# Patient Record
Sex: MALE | Race: BLACK OR AFRICAN AMERICAN | NOT HISPANIC OR LATINO | Employment: FULL TIME | URBAN - METROPOLITAN AREA
[De-identification: names, ages, dates, MRNs, and addresses within clinical notes are randomized per-mention and may not be internally consistent; named-entity substitution may affect disease eponyms.]

---

## 2018-12-17 ENCOUNTER — OFFICE VISIT (OUTPATIENT)
Dept: NEUROLOGY | Facility: CLINIC | Age: 48
End: 2018-12-17

## 2018-12-17 ENCOUNTER — APPOINTMENT (OUTPATIENT)
Dept: LAB | Facility: HOSPITAL | Age: 48
End: 2018-12-17

## 2018-12-17 VITALS
WEIGHT: 196 LBS | HEART RATE: 75 BPM | OXYGEN SATURATION: 98 % | SYSTOLIC BLOOD PRESSURE: 142 MMHG | BODY MASS INDEX: 27.34 KG/M2 | DIASTOLIC BLOOD PRESSURE: 80 MMHG

## 2018-12-17 DIAGNOSIS — G47.33 OBSTRUCTIVE SLEEP APNEA, ADULT: ICD-10-CM

## 2018-12-17 DIAGNOSIS — E78.5 HYPERLIPIDEMIA, UNSPECIFIED HYPERLIPIDEMIA TYPE: ICD-10-CM

## 2018-12-17 DIAGNOSIS — R41.3 SHORT-TERM MEMORY LOSS: ICD-10-CM

## 2018-12-17 DIAGNOSIS — E55.9 VITAMIN D DEFICIENCY: ICD-10-CM

## 2018-12-17 DIAGNOSIS — R53.1 RIGHT SIDED WEAKNESS: ICD-10-CM

## 2018-12-17 DIAGNOSIS — R20.2 PARESTHESIA: ICD-10-CM

## 2018-12-17 DIAGNOSIS — I67.82 TEMPORARY CEREBRAL VASCULAR DYSFUNCTION: Primary | ICD-10-CM

## 2018-12-17 DIAGNOSIS — I10 ESSENTIAL HYPERTENSION: ICD-10-CM

## 2018-12-17 LAB
25(OH)D3 SERPL-MCNC: 10.1 NG/ML
ALBUMIN SERPL-MCNC: 4.39 G/DL (ref 3.2–4.8)
ALBUMIN/GLOB SERPL: 2.1 G/DL (ref 1.5–2.5)
ALP SERPL-CCNC: 95 U/L (ref 25–100)
ALT SERPL W P-5'-P-CCNC: 18 U/L (ref 7–40)
ANION GAP SERPL CALCULATED.3IONS-SCNC: 7 MMOL/L (ref 3–11)
ARTICHOKE IGE QN: 165 MG/DL (ref 0–130)
AST SERPL-CCNC: 16 U/L (ref 0–33)
BASOPHILS # BLD AUTO: 0.1 10*3/MM3 (ref 0–0.2)
BASOPHILS NFR BLD AUTO: 1.4 % (ref 0–1)
BILIRUB SERPL-MCNC: 0.2 MG/DL (ref 0.3–1.2)
BUN BLD-MCNC: 8 MG/DL (ref 9–23)
BUN/CREAT SERPL: 7.4 (ref 7–25)
CALCIUM SPEC-SCNC: 9.1 MG/DL (ref 8.7–10.4)
CHLORIDE SERPL-SCNC: 105 MMOL/L (ref 99–109)
CHOLEST SERPL-MCNC: 199 MG/DL (ref 0–200)
CO2 SERPL-SCNC: 29 MMOL/L (ref 20–31)
CREAT BLD-MCNC: 1.08 MG/DL (ref 0.6–1.3)
DEPRECATED FTI SERPL-MCNC: 1.8 TBI
DEPRECATED RDW RBC AUTO: 45.1 FL (ref 37–54)
EOSINOPHIL # BLD AUTO: 0.11 10*3/MM3 (ref 0–0.3)
EOSINOPHIL NFR BLD AUTO: 1.5 % (ref 0–3)
ERYTHROCYTE [DISTWIDTH] IN BLOOD BY AUTOMATED COUNT: 13.9 % (ref 11.3–14.5)
GFR SERPL CREATININE-BSD FRML MDRD: 88 ML/MIN/1.73
GLOBULIN UR ELPH-MCNC: 2.1 GM/DL
GLUCOSE BLD-MCNC: 74 MG/DL (ref 70–100)
HBA1C MFR BLD: 5.5 % (ref 4.8–5.6)
HCT VFR BLD AUTO: 45.6 % (ref 38.9–50.9)
HDLC SERPL-MCNC: 44 MG/DL (ref 40–60)
HGB BLD-MCNC: 14.7 G/DL (ref 13.1–17.5)
IMM GRANULOCYTES # BLD: 0.01 10*3/MM3 (ref 0–0.03)
IMM GRANULOCYTES NFR BLD: 0.1 % (ref 0–0.6)
LYMPHOCYTES # BLD AUTO: 3.07 10*3/MM3 (ref 0.6–4.8)
LYMPHOCYTES NFR BLD AUTO: 41.7 % (ref 24–44)
MCH RBC QN AUTO: 28.5 PG (ref 27–31)
MCHC RBC AUTO-ENTMCNC: 32.2 G/DL (ref 32–36)
MCV RBC AUTO: 88.5 FL (ref 80–99)
MONOCYTES # BLD AUTO: 0.77 10*3/MM3 (ref 0–1)
MONOCYTES NFR BLD AUTO: 10.5 % (ref 0–12)
NEUTROPHILS # BLD AUTO: 3.31 10*3/MM3 (ref 1.5–8.3)
NEUTROPHILS NFR BLD AUTO: 44.9 % (ref 41–71)
PLATELET # BLD AUTO: 235 10*3/MM3 (ref 150–450)
PMV BLD AUTO: 11.1 FL (ref 6–12)
POTASSIUM BLD-SCNC: 4.2 MMOL/L (ref 3.5–5.5)
PROT SERPL-MCNC: 6.5 G/DL (ref 5.7–8.2)
RBC # BLD AUTO: 5.15 10*6/MM3 (ref 4.2–5.76)
SODIUM BLD-SCNC: 141 MMOL/L (ref 132–146)
T3RU NFR SERPL: 28 % (ref 23–37)
T4 SERPL-MCNC: 6.4 MCG/DL (ref 4.7–11.4)
TRIGL SERPL-MCNC: 78 MG/DL (ref 0–150)
TSH SERPL DL<=0.05 MIU/L-ACNC: 0.77 MIU/ML (ref 0.35–5.35)
VIT B12 BLD-MCNC: 413 PG/ML (ref 211–911)
WBC NRBC COR # BLD: 7.36 10*3/MM3 (ref 3.5–10.8)

## 2018-12-17 PROCEDURE — 80061 LIPID PANEL: CPT | Performed by: NURSE PRACTITIONER

## 2018-12-17 PROCEDURE — 82306 VITAMIN D 25 HYDROXY: CPT | Performed by: NURSE PRACTITIONER

## 2018-12-17 PROCEDURE — 36415 COLL VENOUS BLD VENIPUNCTURE: CPT | Performed by: NURSE PRACTITIONER

## 2018-12-17 PROCEDURE — 84479 ASSAY OF THYROID (T3 OR T4): CPT | Performed by: NURSE PRACTITIONER

## 2018-12-17 PROCEDURE — 80053 COMPREHEN METABOLIC PANEL: CPT | Performed by: NURSE PRACTITIONER

## 2018-12-17 PROCEDURE — 83036 HEMOGLOBIN GLYCOSYLATED A1C: CPT | Performed by: NURSE PRACTITIONER

## 2018-12-17 PROCEDURE — 84443 ASSAY THYROID STIM HORMONE: CPT | Performed by: NURSE PRACTITIONER

## 2018-12-17 PROCEDURE — 84436 ASSAY OF TOTAL THYROXINE: CPT | Performed by: NURSE PRACTITIONER

## 2018-12-17 PROCEDURE — 85025 COMPLETE CBC W/AUTO DIFF WBC: CPT | Performed by: NURSE PRACTITIONER

## 2018-12-17 PROCEDURE — 83921 ORGANIC ACID SINGLE QUANT: CPT | Performed by: NURSE PRACTITIONER

## 2018-12-17 PROCEDURE — 99214 OFFICE O/P EST MOD 30 MIN: CPT | Performed by: NURSE PRACTITIONER

## 2018-12-17 PROCEDURE — 82607 VITAMIN B-12: CPT | Performed by: NURSE PRACTITIONER

## 2018-12-17 RX ORDER — ASPIRIN 325 MG
325 TABLET, DELAYED RELEASE (ENTERIC COATED) ORAL DAILY
Qty: 90 TABLET | Refills: 3 | Status: SHIPPED | OUTPATIENT
Start: 2018-12-17

## 2018-12-17 RX ORDER — AMLODIPINE BESYLATE 5 MG/1
5 TABLET ORAL DAILY
Qty: 90 TABLET | Refills: 3 | Status: SHIPPED | OUTPATIENT
Start: 2018-12-17 | End: 2019-03-18

## 2018-12-17 RX ORDER — ATORVASTATIN CALCIUM 40 MG/1
40 TABLET, FILM COATED ORAL NIGHTLY
Qty: 90 TABLET | Refills: 3 | Status: SHIPPED | OUTPATIENT
Start: 2018-12-17 | End: 2019-03-18 | Stop reason: SDUPTHER

## 2018-12-17 NOTE — PROGRESS NOTES
Subjective:     Patient ID: Denny Smith is a 48 y.o. male.    CC:   Chief Complaint   Patient presents with   • Stroke       HPI:   History of Present Illness   This is a pleasant 47 yo male who presents for some acute concerns today. He was last seen in our Neurology practice on 11/18/2016. He has been lost to follow up and tells me he has not been taking any of his medications since at least Spring of 2017. He is accompanied by his mom during today's visit. He tells me 2 weeks ago he woke up and noticed right sided leg and arm weakness, tingling and numbness and difficulty with walking which lasted all day and then resolved the next day. No other symptoms. He comes today for further evaluation of this event. He did suffer TIA January 2016 and confirmed acute left caudate nucleus ischemic CVA 8/2015 with longstanding short term memory loss since stroke. He had sleep study in 2016 and has been diagnosed with RED, but he never followed up to obtain CPAP and needs to reschedule. He tells me he has not seen a PCP in several years and would like to establish care with a new PCP. He did have Neuro Psych testing done with Dr. Selma Ruiz at [facility] on 10/12/2016- report showed mild frontal difficulty with set-shifting, visual fluency and visual retrieving.  Showed impairment in processing speed and severely impaired fine motor coordination in the right dominant hand.  It stated that he would likely tire more easily and have more concentration problems and this can be exacerbated by sleep disturbances. Continues to smoke 1 cigar about 5 days a week.    The following portions of the patient's history were reviewed and updated as appropriate: allergies, current medications, past family history, past medical history, past social history, past surgical history and problem list.    Past Medical History:   Diagnosis Date   • Acute cerebrovascular accident (CMS/[facility]) 02/16/2016    resolved   • Cardiac disorder    •  Cerebrovascular accident (CMS/[facility]) 5/16/2016   • Heart disease    • Hyperlipidemia    • Hypertension    • Stroke (CMS/[facility])        Past Surgical History:   Procedure Laterality Date   • NO PAST SURGERIES         Social History     Socioeconomic History   • Marital status: Single     Spouse name: Not on file   • Number of children: Not on file   • Years of education: Not on file   • Highest education level: Not on file   Social Needs   • Financial resource strain: Not on file   • Food insecurity - worry: Not on file   • Food insecurity - inability: Not on file   • Transportation needs - medical: Not on file   • Transportation needs - non-medical: Not on file   Occupational History   • Not on file   Tobacco Use   • Smoking status: Current Some Day Smoker     Packs/day: 0.25     Types: Cigarettes, Cigars   • Smokeless tobacco: Never Used   • Tobacco comment: smokes 1 cigar most days of the week 25/month   Substance and Sexual Activity   • Alcohol use: Yes     Comment: occasional use   • Drug use: No   • Sexual activity: Defer   Other Topics Concern   • Not on file   Social History Narrative   • Not on file       Family History   Problem Relation Age of Onset   • Stroke Mother    • Diabetes Mother    • Hypertension Mother    • Cancer Mother    • Mental illness Mother    • Migraines Mother    • Other Other         cva        Review of Systems   Constitutional: Negative for chills, fatigue, fever and unexpected weight change.   HENT: Negative for ear pain, hearing loss, nosebleeds, rhinorrhea and sore throat.    Eyes: Negative for photophobia, pain, discharge, itching and visual disturbance.   Respiratory: Negative for cough, chest tightness, shortness of breath and wheezing.    Cardiovascular: Negative for chest pain, palpitations and leg swelling.   Gastrointestinal: Negative for abdominal pain, blood in stool, constipation, diarrhea, nausea and vomiting.   Genitourinary: Negative for dysuria, frequency, hematuria and  urgency.   Musculoskeletal: Negative for arthralgias, back pain, gait problem, joint swelling, myalgias, neck pain and neck stiffness.   Skin: Negative for rash and wound.   Allergic/Immunologic: Negative for environmental allergies and food allergies.   Neurological: Negative for dizziness, tremors, seizures, syncope, speech difficulty, weakness, light-headedness, numbness and headaches.   Hematological: Negative for adenopathy. Does not bruise/bleed easily.   Psychiatric/Behavioral: Negative for agitation, confusion, decreased concentration, hallucinations, sleep disturbance and suicidal ideas. The patient is nervous/anxious.    All other systems reviewed and are negative.       Objective:    Neurologic Exam     Mental Status   Oriented to person, place, and time.   Speech: speech is normal   Level of consciousness: alert    Cranial Nerves     CN II   Visual fields full to confrontation.     CN III, IV, VI   Pupils are equal, round, and reactive to light.  Extraocular motions are normal.     CN V   Facial sensation intact.     CN VII   Right facial weakness: none  Left facial weakness: central (very subtle change)  Right taste: normal  Left taste: normal    CN VIII   CN VIII normal.     CN IX, X   CN IX normal.   CN X normal.     CN XI   CN XI normal.     CN XII   CN XII normal.     Motor Exam   Muscle bulk: normal  Overall muscle tone: normal    Strength   Strength 5/5 throughout.     Sensory Exam   Light touch normal.   Vibration normal.   Proprioception normal.   Pinprick normal.     Gait, Coordination, and Reflexes     Gait  Gait: normal    Coordination   Romberg: negative  Finger to nose coordination: normal  Heel to shin coordination: normal    Tremor   Resting tremor: absent  Intention tremor: present (very minimal BUE kinetic tremor)  Action tremor: absent    Reflexes   Right brachioradialis: 2+  Left brachioradialis: 2+  Right biceps: 2+  Left biceps: 2+  Right triceps: 2+  Left triceps: 2+  Right  patellar: 2+  Left patellar: 2+  Right achilles: 2+  Left achilles: 2+  Right : 2+  Left : 2+  Right hand fine motor skills chronically impaired since CVA 2015       Physical Exam   Constitutional: He is oriented to person, place, and time.   Eyes: EOM are normal. Pupils are equal, round, and reactive to light.   Neurological: He is oriented to person, place, and time. He has normal strength. He has a normal Finger-Nose-Finger Test, a normal Heel to Cerna Test and a normal Romberg Test. Gait normal.   Reflex Scores:       Tricep reflexes are 2+ on the right side and 2+ on the left side.       Bicep reflexes are 2+ on the right side and 2+ on the left side.       Brachioradialis reflexes are 2+ on the right side and 2+ on the left side.       Patellar reflexes are 2+ on the right side and 2+ on the left side.       Achilles reflexes are 2+ on the right side and 2+ on the left side.  Psychiatric: He has a normal mood and affect. His speech is normal and behavior is normal. Judgment and thought content normal. Cognition and memory are impaired. He exhibits abnormal recent memory. He exhibits normal remote memory.       Assessment/Plan:       Denny was seen today for stroke.    Diagnoses and all orders for this visit:    Temporary cerebral vascular dysfunction  -     Comprehensive Metabolic Panel  -     CBC & Differential  -     Hemoglobin A1c  -     Thyroid Panel With TSH  -     Lipid Panel  -     atorvastatin (LIPITOR) 40 MG tablet; Take 1 tablet by mouth Every Night.  -     aspirin  MG tablet; Take 1 tablet by mouth Daily.  -     Duplex Carotid Ultrasound CAR; Future  -     Adult Transthoracic Echo Complete W/ Cont if Necessary Per Protocol; Future  -     CBC Auto Differential    Essential hypertension  -     Comprehensive Metabolic Panel  -     CBC & Differential  -     Hemoglobin A1c  -     amLODIPine (NORVASC) 5 MG tablet; Take 1 tablet by mouth Daily.  -     Ambulatory Referral to Internal  Medicine  -     Adult Transthoracic Echo Complete W/ Cont if Necessary Per Protocol; Future  -     CBC Auto Differential    Hyperlipidemia, unspecified hyperlipidemia type  -     Hemoglobin A1c  -     Lipid Panel  -     atorvastatin (LIPITOR) 40 MG tablet; Take 1 tablet by mouth Every Night.  -     Ambulatory Referral to Internal Medicine  -     Duplex Carotid Ultrasound CAR; Future  -     Adult Transthoracic Echo Complete W/ Cont if Necessary Per Protocol; Future    Right sided weakness  -     MRI Brain With & Without Contrast  -     Adult Transthoracic Echo Complete W/ Cont if Necessary Per Protocol; Future    Vitamin D deficiency  -     Vitamin D 25 Hydroxy    Paresthesia  -     Vitamin B12  -     Methylmalonic Acid, Serum    Obstructive sleep apnea, adult  Comments:  Reschedule with Sleep medicine to start CPAP.    Short-term memory loss  Comments:  2nd to CVA 2015         I do suspect he has suffered another TIA.  He has not been on any medications and at least a year and a half.  I recommended we restart his Lipitor, amlodipine and aspirin.  We will order an MRI of the brain with and without contrast rule out acute intracranial process with recent stroke symptoms.  We will also order an echocardiogram to rule out PFO.  He also rule out carotid ultrasound to rule out carotid artery stenosis disease and off all medications.  I have stressed the importance of compliance with his medications.  We will check all his labs today.  We will get him to reschedule with sleep medicine to start his CPAP for obstructive sleep apnea.  We will refer him to a primary coronary care provider to establish care and to be followed regularly.  Her age tobacco cessation.  Follow-up in 3 months or sooner if needed.  We will notify him by phone of all test results and further recommendations. Reviewed medications, potential side effects and signs and symptoms to report. Discussed risk versus benefits of treatment plan with patient  and/or family-including medications, labs and radiology that may be ordered. Addressed questions and concerns during visit. Patient and/or family verbalized understanding and agree with plan.    Discussed signs and symptoms of stroke and when to call 911. Instructed to follow a low fat diet including the Mediterranean diet. Instructed to take all medications daily as prescribed. Encouraged 30 minutes of exercise 3-4 times a week as tolerated. Stay well hydrated. Discussed potential side effects of new medications and signs and symptoms to report. If patient is currently using tobacco products, smoking cessation was encouraged. Reviewed stroke risk factors and stroke prevention plan. Patient and/or family verbalizes understanding and agrees with plan.     During this visit the following were done:  Labs Reviewed []    Labs Ordered [x]    Radiology Reports Reviewed []    Radiology Ordered [x]    PCP Records Reviewed []    Referring Provider Records Reviewed []    ER Records Reviewed []    Hospital Records Reviewed []    History Obtained From Family []    Radiology Images Reviewed []    Other Reviewed [x]  Neuro psych testing 11/2016  Records Requested []      EMR Dragon/Transcription Disclaimer:  Much of this encounter note is an electronic transcription of spoken language to printed text. Electronic transcription of spoken language may permit erroneous words or phrases to be inadvertently transcribed. Although I have reviewed the note for such errors, some may still exist in this documentation.      Carmen Mckeon, APRN  12/17/2018

## 2018-12-19 DIAGNOSIS — E53.8 LOW SERUM VITAMIN B12: ICD-10-CM

## 2018-12-19 DIAGNOSIS — E55.9 VITAMIN D DEFICIENCY: Primary | ICD-10-CM

## 2018-12-19 RX ORDER — ERGOCALCIFEROL 1.25 MG/1
50000 CAPSULE ORAL WEEKLY
Qty: 4 CAPSULE | Refills: 3 | Status: SHIPPED | OUTPATIENT
Start: 2018-12-19 | End: 2019-03-18 | Stop reason: SDUPTHER

## 2018-12-19 RX ORDER — LANOLIN ALCOHOL/MO/W.PET/CERES
1000 CREAM (GRAM) TOPICAL DAILY
Qty: 90 TABLET | Refills: 3 | Status: SHIPPED | OUTPATIENT
Start: 2018-12-19

## 2018-12-19 NOTE — PROGRESS NOTES
Please notify the patient that his blood work looks excellent overall.  He is not diabetic.  His liver and kidney function looked excellent.  His vitamin D level is very low and I am sending in high-dose vitamin D for him to take once a week for the next 4 months and then he should obtain vitamin D 4000 units to take once daily over the counter after that.  His cholesterol panel actually looks very good as well.  I do recommend that he take his cholesterol medication every night since he has had prior strokes.  His thyroid looks excellent.  His vitamin B12 level is in the low normal range and I'm going to send in some vitamin B12 for him to take once a day.  We will follow-up as scheduled in office.  Thank you, ETHEL Figueroa.    We are supposed to be referring him to a new primary care provider and this primary care provider within [facility] will be able to see his lab results.

## 2018-12-20 ENCOUNTER — TELEPHONE (OUTPATIENT)
Dept: NEUROLOGY | Facility: CLINIC | Age: 48
End: 2018-12-20

## 2018-12-20 NOTE — TELEPHONE ENCOUNTER
I called pt to let him know the results below and that Carmen had called in a script for vitamin D an vitamin B-12 to pharmacy and when taken as directed to do otc medications to replace those.

## 2018-12-20 NOTE — TELEPHONE ENCOUNTER
----- Message from ETHEL Mckee sent at 12/19/2018  1:15 PM EST -----  Please notify the patient that his blood work looks excellent overall.  He is not diabetic.  His liver and kidney function looked excellent.  His vitamin D level is very low and I am sending in high-dose vitamin D for him to take once a week for the next 4 months and then he should obtain vitamin D 4000 units to take once daily over the counter after that.  His cholesterol panel actually looks very good as well.  I do recommend that he take his cholesterol medication every night since he has had prior strokes.  His thyroid looks excellent.  His vitamin B12 level is in the low normal range and I'm going to send in some vitamin B12 for him to take once a day.  We will follow-up as scheduled in office.  Thank you, ETHEL Figueroa.    We are supposed to be referring him to a new primary care provider and this primary care provider within [facility] will be able to see his lab results.

## 2018-12-21 LAB
Lab: NORMAL
METHYLMALONATE SERPL-SCNC: 170 NMOL/L (ref 0–378)

## 2019-01-03 ENCOUNTER — APPOINTMENT (OUTPATIENT)
Dept: CARDIOLOGY | Facility: HOSPITAL | Age: 49
End: 2019-01-03

## 2019-01-03 ENCOUNTER — HOSPITAL ENCOUNTER (OUTPATIENT)
Dept: MRI IMAGING | Facility: HOSPITAL | Age: 49
End: 2019-01-03

## 2019-01-03 ENCOUNTER — HOSPITAL ENCOUNTER (OUTPATIENT)
Dept: CARDIOLOGY | Facility: HOSPITAL | Age: 49
End: 2019-01-03

## 2019-01-22 ENCOUNTER — TELEPHONE (OUTPATIENT)
Dept: NEUROLOGY | Facility: CLINIC | Age: 49
End: 2019-01-22

## 2019-01-22 NOTE — TELEPHONE ENCOUNTER
----- Message from Deloris Hernandez sent at 1/22/2019  1:11 PM EST -----  Regarding: LETY CONROY  PATIENT CALLED TO GET IN WITH A EARLIER APPT DUE TO COUGHING UP FRESH BLOOD.  I SPOKE TO LETY CONROY AND SHE TOLD ME TO TELL HIM TO GO TO THE ER OR SEEK HELP FROM HIS PCP.

## 2019-01-22 NOTE — TELEPHONE ENCOUNTER
Yes, coughing up blood would be an emergency and need to be evaluated in an acute care setting and not a neurology office.

## 2019-03-18 ENCOUNTER — OFFICE VISIT (OUTPATIENT)
Dept: NEUROLOGY | Facility: CLINIC | Age: 49
End: 2019-03-18

## 2019-03-18 VITALS
HEART RATE: 75 BPM | SYSTOLIC BLOOD PRESSURE: 128 MMHG | HEIGHT: 70 IN | BODY MASS INDEX: 27.92 KG/M2 | DIASTOLIC BLOOD PRESSURE: 80 MMHG | WEIGHT: 195 LBS | OXYGEN SATURATION: 98 %

## 2019-03-18 DIAGNOSIS — R41.3 SHORT-TERM MEMORY LOSS: ICD-10-CM

## 2019-03-18 DIAGNOSIS — E53.8 LOW SERUM VITAMIN B12: ICD-10-CM

## 2019-03-18 DIAGNOSIS — I67.82 TEMPORARY CEREBRAL VASCULAR DYSFUNCTION: ICD-10-CM

## 2019-03-18 DIAGNOSIS — E55.9 VITAMIN D DEFICIENCY: ICD-10-CM

## 2019-03-18 DIAGNOSIS — N39.46 MIXED STRESS AND URGE URINARY INCONTINENCE: ICD-10-CM

## 2019-03-18 DIAGNOSIS — I10 ESSENTIAL HYPERTENSION: ICD-10-CM

## 2019-03-18 DIAGNOSIS — Z86.73 HISTORY OF CVA (CEREBROVASCULAR ACCIDENT): ICD-10-CM

## 2019-03-18 DIAGNOSIS — E78.5 HYPERLIPIDEMIA, UNSPECIFIED HYPERLIPIDEMIA TYPE: ICD-10-CM

## 2019-03-18 DIAGNOSIS — I67.82 TEMPORARY CEREBRAL VASCULAR DYSFUNCTION: Primary | ICD-10-CM

## 2019-03-18 PROCEDURE — 99213 OFFICE O/P EST LOW 20 MIN: CPT | Performed by: NURSE PRACTITIONER

## 2019-03-18 RX ORDER — AMLODIPINE BESYLATE 5 MG/1
5 TABLET ORAL DAILY
Qty: 90 TABLET | Refills: 3 | Status: SHIPPED | OUTPATIENT
Start: 2019-03-18

## 2019-03-18 RX ORDER — ATORVASTATIN CALCIUM 40 MG/1
40 TABLET, FILM COATED ORAL NIGHTLY
Qty: 90 TABLET | Refills: 3 | Status: SHIPPED | OUTPATIENT
Start: 2019-03-18

## 2019-03-18 RX ORDER — ERGOCALCIFEROL 1.25 MG/1
50000 CAPSULE ORAL WEEKLY
Qty: 4 CAPSULE | Refills: 3 | Status: SHIPPED | OUTPATIENT
Start: 2019-03-18

## 2019-03-18 NOTE — PATIENT INSTRUCTIONS
[facility] in [place], [place]-get labs drawn here- make sure fasting 8 hours with no food or drink except water.    [address] [place] [address]

## 2019-03-18 NOTE — PROGRESS NOTES
Subjective:     Patient ID: Denny Smith Jr. is a 49 y.o. male.    CC:   Chief Complaint   Patient presents with   • Temporal Cerebral Vacular dysfunction       HPI:   History of Present Illness   This is a pleasant 50 yo male who presents for 3 month follow up on recurrent TIA and history of CVA. He is by himself today. He has quit smoking completely. He is taking his amlodipine, aspirin and atorvastatin every day as prescribed now. In past he has been lost to follow up on had compliance issues previously. Had a TIA Fall 2018 and TIA January 2016 and confirmed acute left caudate nucleus ischemic CVA 8/2015 with longstanding short term memory loss since stroke. He had sleep study in 2016 and has been diagnosed with RED, but he never followed up to obtain CPAP and has not rescheduled d/t costs. Last visit we ordered repeat MRI, Carotids and ECHO but cancelled these due to costs and high copays. He does not have a PCP. Due for labs. He did have Neuro Psych testing done with Dr. Selma Ruiz at [facility] on 10/12/2016- report showed mild frontal difficulty with set-shifting, visual fluency and visual retrieving.  Showed impairment in processing speed and severely impaired fine motor coordination in the right dominant hand.  It stated that he would likely tire more easily and have more concentration problems and this can be exacerbated by sleep disturbances.  He tells me since his stroke he has had occasional bladder incontinence where he has difficulty getting to the bathroom or the urge to go hits him quickly and he does not make it has an accident.  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 This paperwork was completed today.  Last visit in December 2018 his vitamin D was low at 10.1, methylmalonic  acid normal at 170, vitamin B12 413, lipid panel with total cholesterol 199, triglycerides 78, HDL 44, LDL elevated at 165.  Thyroid panel with TSH within normal limits, hemoglobin A1c 5.5%, CM P within normal limits, CBC with differential within normal limits.    The following portions of the patient's history were reviewed and updated as appropriate: allergies, current medications, past family history, past medical history, past social history, past surgical history and problem list.    Past Medical History:   Diagnosis Date   • Acute cerebrovascular accident (CMS/HCC) 02/16/2016    resolved   • Cardiac disorder    • Cerebrovascular accident (CMS/HCC) 5/16/2016   • Heart disease    • Hyperlipidemia    • Hypertension    • Stroke (CMS/HCC)        Past Surgical History:   Procedure Laterality Date   • NO PAST SURGERIES         Social History     Socioeconomic History   • Marital status: Single     Spouse name: Not on file   • Number of children: Not on file   • Years of education: Not on file   • Highest education level: Not on file   Social Needs   • Financial resource strain: Not on file   • Food insecurity - worry: Not on file   • Food insecurity - inability: Not on file   • Transportation needs - medical: Not on file   • Transportation needs - non-medical: Not on file   Occupational History   • Not on file   Tobacco Use   • Smoking status: Former Smoker     Packs/day: 0.25     Types: Cigarettes, Cigars   • Smokeless tobacco: Never Used   • Tobacco comment: smokes 1 cigar most days of the week 25/month   Substance and Sexual Activity   • Alcohol use: Yes     Comment: occasional use   • Drug use: No   • Sexual activity: Defer   Other Topics Concern   • Not on file   Social History Narrative   • Not on file       Family History   Problem Relation Age of Onset   • Stroke Mother    • Diabetes Mother    • Hypertension Mother    • Cancer Mother    • Mental illness Mother    • Migraines Mother    • Other Other          cva        Review of Systems   Constitutional: Negative for fatigue and unexpected weight change.   HENT: Negative.    Eyes: Negative for pain and visual disturbance.   Respiratory: Negative.    Cardiovascular: Negative for chest pain and palpitations.   Gastrointestinal: Negative for constipation, diarrhea, nausea and vomiting.   Endocrine: Negative.    Genitourinary: Positive for urgency.   Musculoskeletal: Positive for neck stiffness. Negative for back pain.   Skin: Negative.    Allergic/Immunologic: Negative.    Neurological: Negative for dizziness, weakness and headaches.        Objective:    Neurologic Exam  Mental Status   Oriented to person, place, and time.   Speech: speech is normal   Level of consciousness: alert     Cranial Nerves      CN II   Visual fields full to confrontation.      CN III, IV, VI   Pupils are equal, round, and reactive to light.  Extraocular motions are normal.      CN V   Facial sensation intact.      CN VII   Right facial weakness: none  Left facial weakness: central (very subtle change)  Right taste: normal  Left taste: normal     CN VIII   CN VIII normal.      CN IX, X   CN IX normal.   CN X normal.      CN XI   CN XI normal.      CN XII   CN XII normal.      Motor Exam   Muscle bulk: normal  Overall muscle tone: normal     Strength   Strength 5/5 throughout.      Sensory Exam   Light touch normal.   Vibration normal.   Proprioception normal.   Pinprick normal.      Gait, Coordination, and Reflexes      Gait  Gait: normal     Coordination   Romberg: negative  Finger to nose coordination: normal  Heel to shin coordination: normal     Tremor   Resting tremor: absent  Intention tremor: present (very minimal BUE kinetic tremor)  Action tremor: absent     Reflexes   Right brachioradialis: 2+  Left brachioradialis: 2+  Right biceps: 2+  Left biceps: 2+  Right triceps: 2+  Left triceps: 2+  Right patellar: 2+  Left patellar: 2+  Right achilles: 2+  Left achilles: 2+  Right : 2+  Left  : 2+  Right hand fine motor skills chronically impaired since CVA 2015         Physical Exam  Constitutional: He is oriented to person, place, and time.   Eyes: EOM are normal. Pupils are equal, round, and reactive to light.   Neurological: He is oriented to person, place, and time. He has normal strength. He has a normal Finger-Nose-Finger Test, a normal Heel to Cerna Test and a normal Romberg Test. Gait normal.   Reflex Scores:       Tricep reflexes are 2+ on the right side and 2+ on the left side.       Bicep reflexes are 2+ on the right side and 2+ on the left side.       Brachioradialis reflexes are 2+ on the right side and 2+ on the left side.       Patellar reflexes are 2+ on the right side and 2+ on the left side.       Achilles reflexes are 2+ on the right side and 2+ on the left side.  Psychiatric: He has a normal mood and affect. His speech is normal and behavior is normal. Judgment and thought content normal. Cognition and memory are impaired. He exhibits abnormal recent memory. He exhibits normal remote memory.     Assessment/Plan:          Denny was seen today for temporal cerebral vacular dysfunction.    Diagnoses and all orders for this visit:    Temporary cerebral vascular dysfunction  Comments:  Continue aspirin and lipitor.  Orders:  -     CBC & Differential; Future  -     Comprehensive Metabolic Panel; Future  -     atorvastatin (LIPITOR) 40 MG tablet; Take 1 tablet by mouth Every Night.    Mixed stress and urge urinary incontinence  Comments:  Work recommendations-restroom breaks q1-2 hours. PRN 15 min break to home to change and return to work.    History of CVA (cerebrovascular accident)  Comments:  Continue tobacco cessation. Continue current meds-stressed compliance.    Vitamin D deficiency  -     vitamin D (ERGOCALCIFEROL) 40914 units capsule capsule; Take 1 capsule by mouth 1 (One) Time Per Week.  -     Vitamin D 25 Hydroxy; Future    Hyperlipidemia, unspecified hyperlipidemia  type  Comments:  Continue lipitor  Orders:  -     CBC & Differential; Future  -     Comprehensive Metabolic Panel; Future  -     Lipid Panel; Future  -     atorvastatin (LIPITOR) 40 MG tablet; Take 1 tablet by mouth Every Night.    Essential hypertension  Comments:  continue amlodipine  Orders:  -     CBC & Differential; Future  -     Comprehensive Metabolic Panel; Future  -     amLODIPine (NORVASC) 5 MG tablet; Take 1 tablet by mouth Daily.    Low serum vitamin B12  -     Vitamin B12 & Folate; Future  -     Methylmalonic Acid, Serum; Future    Temporary cerebral vascular dysfunction  -     CBC & Differential; Future  -     Comprehensive Metabolic Panel; Future  -     atorvastatin (LIPITOR) 40 MG tablet; Take 1 tablet by mouth Every Night.    Short-term memory loss  Comments:  2nd to CVA. Minimal.           He should be allowed to have a 15-minute break to return to his home during work if he does experience bladder incontinence to change his clothes & complete urinary incontinence hygiene and then return to work for the remainder of the day.  It is also recommended that he be allowed to have restroom breaks every 1-2 hours to complete bladder training and limit the risk of urinary incontinence since his stroke. Continue current medications.  He is to have fasting labs in the next week.  Follow-up in 6 months or sooner if needed. Reviewed medications, potential side effects and signs and symptoms to report. Discussed risk versus benefits of treatment plan with patient and/or family-including medications, labs and radiology that may be ordered. Addressed questions and concerns during visit. Patient and/or family verbalized understanding and agree with plan.    During this visit the following were done:  Labs Reviewed [x]    Labs Ordered [x]    Radiology Reports Reviewed []    Radiology Ordered []    PCP Records Reviewed []    Referring Provider Records Reviewed []    ER Records Reviewed []    Hospital Records  Reviewed []    History Obtained From Family []    Radiology Images Reviewed []    Other Reviewed []    Records Requested []      EMR Dragon/Transcription Disclaimer:  Much of this encounter note is an electronic transcription of spoken language to printed text. Electronic transcription of spoken language may permit erroneous words or phrases to be inadvertently transcribed. Although I have reviewed the note for such errors, some may still exist in this documentation.     Carmen Mckeon, APRN  3/18/2019

## 2019-08-03 DIAGNOSIS — E55.9 VITAMIN D DEFICIENCY: ICD-10-CM

## 2019-08-05 RX ORDER — ERGOCALCIFEROL 1.25 MG/1
50000 CAPSULE ORAL WEEKLY
Qty: 12 CAPSULE | Refills: 1 | OUTPATIENT
Start: 2019-08-05

## 2024-04-23 ENCOUNTER — READMISSION MANAGEMENT (OUTPATIENT)
Dept: CALL CENTER | Facility: HOSPITAL | Age: 54
End: 2024-04-23
Payer: COMMERCIAL

## 2024-04-23 NOTE — OUTREACH NOTE
Prep Survey      Flowsheet Row Responses   [facility] facility patient discharged from? Non-BH   Is LACE score < 7 ? Non-BH Discharge   Eligibility Not Eligible   [facility]   Date of Discharge 04/22/24   Discharge Disposition Home-Health Care [facility]   What are the reasons patient is not eligible? Other  [NO [facility] [place] [place] PCP]   Discharge diagnosis RECTAL PAIN WITH MINOR BLEEDING   Does the patient have one of the following disease processes/diagnoses(primary or secondary)? Other   Does the patient have Home health ordered? Yes   What [facility] the Home health agency?  [facility]   Prep survey completed? Yes            Marsha ANN - Registered Nurse